# Patient Record
Sex: FEMALE | Race: WHITE | NOT HISPANIC OR LATINO | Employment: FULL TIME | URBAN - METROPOLITAN AREA
[De-identification: names, ages, dates, MRNs, and addresses within clinical notes are randomized per-mention and may not be internally consistent; named-entity substitution may affect disease eponyms.]

---

## 2020-12-27 NOTE — PROGRESS NOTES
Subjective:      Brittani Jameson is a 60 y.o. female who presents with Tingling            She is post tibial tendon and hammertoe repair. She was referred to neurology for nighttime squeezing pain in her feet.     She endorses the feeling of a bandage wrapping her feet and sand in between her toes. This has been a problem for 2 years. Both feet are affected. She used to have cramping in her toes but this has improved after her surgery. She can tolerate the discomfort when she is awake but she has this discomfort throughout the entire day. It does not prevent her from sleeping.     She has a history of alcohol use with 10 years of weekend drinking on her days off (6 beers/day). She has not been drinking for 3 years when she discovered that she has liver disease.     It is not extremely bothersome and she mainly wants a diagnosis for her symptoms.           Review of Systems   Constitutional: Negative for chills, fever and weight loss.   HENT: Negative for ear pain, sore throat and tinnitus.    Eyes: Negative for blurred vision, double vision and pain.   Respiratory: Negative for cough, shortness of breath and wheezing.    Cardiovascular: Negative for chest pain and palpitations.   Gastrointestinal: Negative for abdominal pain, diarrhea, nausea and vomiting.   Genitourinary: Negative for dysuria and frequency.   Musculoskeletal: Negative for back pain, myalgias and neck pain.   Skin: Negative for rash.   Neurological: Negative for dizziness, tremors, weakness and headaches.   Psychiatric/Behavioral: Negative for depression. The patient is not nervous/anxious and does not have insomnia.        Past medical history:   Past Medical History:   Diagnosis Date   • Chronic hepatitis C without mention of hepatic coma 10/17/2009   • Human T-cell lymphotrophic virus, type I (HTLV-I)    • Leg cramps    • Varicose vein        Past surgical history:   Past Surgical History:   Procedure Laterality Date   • PRIMARY C SECTION       x1   • TUBAL COAGULATION LAPAROSCOPIC BILATERAL         Family history:   Family History   Problem Relation Age of Onset   • Heart Disease Father    • Lung Disease Sister    • Alcohol/Drug Brother    • Heart Disease Brother    • Cancer Maternal Grandmother    • Cancer Paternal Grandmother        Social history:   Social History     Socioeconomic History   • Marital status:      Spouse name: Not on file   • Number of children: Not on file   • Years of education: Not on file   • Highest education level: Not on file   Occupational History   • Not on file   Social Needs   • Financial resource strain: Not on file   • Food insecurity     Worry: Not on file     Inability: Not on file   • Transportation needs     Medical: Not on file     Non-medical: Not on file   Tobacco Use   • Smoking status: Never Smoker   • Smokeless tobacco: Never Used   Substance and Sexual Activity   • Alcohol use: Not Currently     Alcohol/week: 5.0 oz     Types: 10 Cans of beer per week     Drinks per session: 5 or 6   • Drug use: No   • Sexual activity: Not on file   Lifestyle   • Physical activity     Days per week: Not on file     Minutes per session: Not on file   • Stress: Not on file   Relationships   • Social connections     Talks on phone: Not on file     Gets together: Not on file     Attends Hindu service: Not on file     Active member of club or organization: Not on file     Attends meetings of clubs or organizations: Not on file     Relationship status: Not on file   • Intimate partner violence     Fear of current or ex partner: Not on file     Emotionally abused: Not on file     Physically abused: Not on file     Forced sexual activity: Not on file   Other Topics Concern   • Not on file   Social History Narrative   • Not on file       Current medications:   Current Outpatient Medications   Medication   • Probiotic Product (ALIGN) 4 MG Cap     No current facility-administered medications for this visit.        Medication  "Allergy:  Allergies   Allergen Reactions   • Nkda [No Known Drug Allergy]           Objective:     /80 (BP Location: Right arm, Patient Position: Sitting, BP Cuff Size: Adult)   Pulse 87   Temp 36.5 °C (97.7 °F) (Temporal)   Resp 16   Ht 1.676 m (5' 6\")   Wt 83.9 kg (184 lb 15.5 oz)   SpO2 98%   BMI 29.85 kg/m²      Physical Exam       Lab Data Review:  No results found for: HBA1C       Assessment/Plan:     Problem List Items Addressed This Visit     Idiopathic peripheral neuropathy    Relevant Orders    VITAMIN B12    VITAMIN B6    HEMOGLOBIN A1C    SPEP W/REFLEX TO LORIE, A, G, M          1. Idiopathic peripheral neuropathy  - VITAMIN B12; Future  - VITAMIN B6; Future  - HEMOGLOBIN A1C; Future  - SPEP W/REFLEX TO LORIE, A, G, M       -This patient's description of the symptoms in her feet is consistent with a distal small fiber polyneuropathy. She is not on any medications that may cause neuropathy. I have counseled her on the etiologies including diabetes, alcohol, and vitamin B deficiencies. I have ordered a lab workup for etiologies of small fiber neuropathy. Given absence of motor or large fiber findings, a NCS will not be performed. Her discomfort is not severe enough to warrant drug treatment.     FOLLOW-UP:   Return in about 1 month (around 1/28/2021) for telemedicine .    I spent 30 minutes face-to-face in which greater than 50% of the visit was spent in counseling/coordination of care of peripheral neuropathy.     Dr. Parker Trevino D.O.  [facility]   Movement Disorders Specialist     "

## 2020-12-28 ENCOUNTER — OFFICE VISIT (OUTPATIENT)
Dept: NEUROLOGY | Facility: MEDICAL CENTER | Age: 60
End: 2020-12-28
Attending: PSYCHIATRY & NEUROLOGY
Payer: COMMERCIAL

## 2020-12-28 VITALS
WEIGHT: 184.97 LBS | TEMPERATURE: 97.7 F | HEART RATE: 87 BPM | OXYGEN SATURATION: 98 % | HEIGHT: 66 IN | DIASTOLIC BLOOD PRESSURE: 80 MMHG | RESPIRATION RATE: 16 BRPM | SYSTOLIC BLOOD PRESSURE: 124 MMHG | BODY MASS INDEX: 29.73 KG/M2

## 2020-12-28 DIAGNOSIS — G60.9 IDIOPATHIC PERIPHERAL NEUROPATHY: ICD-10-CM

## 2020-12-28 PROCEDURE — 99201 HCHG OFFICE VISIT-NEW-LEVEL 1: CPT | Performed by: PSYCHIATRY & NEUROLOGY

## 2020-12-28 PROCEDURE — 99204 OFFICE O/P NEW MOD 45 MIN: CPT | Performed by: PSYCHIATRY & NEUROLOGY

## 2020-12-28 SDOH — HEALTH STABILITY: MENTAL HEALTH: HOW MANY STANDARD DRINKS CONTAINING ALCOHOL DO YOU HAVE ON A TYPICAL DAY?: 5 OR 6

## 2020-12-28 ASSESSMENT — ENCOUNTER SYMPTOMS
DIARRHEA: 0
INSOMNIA: 0
HEADACHES: 0
CHILLS: 0
DEPRESSION: 0
SORE THROAT: 0
PALPITATIONS: 0
MYALGIAS: 0
TREMORS: 0
BLURRED VISION: 0
NERVOUS/ANXIOUS: 0
DIZZINESS: 0
VOMITING: 0
WHEEZING: 0
ABDOMINAL PAIN: 0
FEVER: 0
EYE PAIN: 0
BACK PAIN: 0
DOUBLE VISION: 0
NAUSEA: 0
WEIGHT LOSS: 0
SHORTNESS OF BREATH: 0
COUGH: 0
WEAKNESS: 0
NECK PAIN: 0

## 2020-12-28 NOTE — PATIENT INSTRUCTIONS
I have ordered some blood tests to evaluate for causes of peripheral neuropathy.   Since the pain is not bothersome, no drugs will be prescribed.   We will have a virtual follow-up visit in 1-2 months.       Peripheral Neuropathy  Peripheral neuropathy is a type of nerve damage. It affects nerves that carry signals between the spinal cord and the arms, legs, and the rest of the body (peripheral nerves). It does not affect nerves in the spinal cord or brain. In peripheral neuropathy, one nerve or a group of nerves may be damaged. Peripheral neuropathy is a broad category that includes many specific nerve disorders, like diabetic neuropathy, hereditary neuropathy, and carpal tunnel syndrome.  What are the causes?  This condition may be caused by:  · Diabetes. This is the most common cause of peripheral neuropathy.  · Nerve injury.  · Pressure or stress on a nerve that lasts a long time.  · Lack (deficiency) of B vitamins. This can result from alcoholism, poor diet, or a restricted diet.  · Infections.  · Autoimmune diseases, such as rheumatoid arthritis and systemic lupus erythematosus.  · Nerve diseases that are passed from parent to child (inherited).  · Some medicines, such as cancer medicines (chemotherapy).  · Poisonous (toxic) substances, such as lead and mercury.  · Too little blood flowing to the legs.  · Kidney disease.  · Thyroid disease.  In some cases, the cause of this condition is not known.  What are the signs or symptoms?  Symptoms of this condition depend on which of your nerves is damaged. Common symptoms include:  · Loss of feeling (numbness) in the feet, hands, or both.  · Tingling in the feet, hands, or both.  · Burning pain.  · Very sensitive skin.  · Weakness.  · Not being able to move a part of the body (paralysis).  · Muscle twitching.  · Clumsiness or poor coordination.  · Loss of balance.  · Not being able to control your bladder.  · Feeling dizzy.  · Sexual problems.  How is this  diagnosed?  Diagnosing and finding the cause of peripheral neuropathy can be difficult. Your health care provider will take your medical history and do a physical exam. A neurological exam will also be done. This involves checking things that are affected by your brain, spinal cord, and nerves (nervous system). For example, your health care provider will check your reflexes, how you move, and what you can feel.  You may have other tests, such as:  · Blood tests.  · Electromyogram (EMG) and nerve conduction tests. These tests check nerve function and how well the nerves are controlling the muscles.  · Imaging tests, such as CT scans or MRI to rule out other causes of your symptoms.  · Removing a small piece of nerve to be examined in a lab (nerve biopsy). This is rare.  · Removing and examining a small amount of the fluid that surrounds the brain and spinal cord (lumbar puncture). This is rare.  How is this treated?  Treatment for this condition may involve:  · Treating the underlying cause of the neuropathy, such as diabetes, kidney disease, or vitamin deficiencies.  · Stopping medicines that can cause neuropathy, such as chemotherapy.  · Medicine to relieve pain. Medicines may include:  ? Prescription or over-the-counter pain medicine.  ? Antiseizure medicine.  ? Antidepressants.  ? Pain-relieving patches that are applied to painful areas of skin.  · Surgery to relieve pressure on a nerve or to destroy a nerve that is causing pain.  · Physical therapy to help improve movement and balance.  · Devices to help you move around (assistive devices).  Follow these instructions at home:  Medicines  · Take over-the-counter and prescription medicines only as told by your health care provider. Do not take any other medicines without first asking your health care provider.  · Do not drive or use heavy machinery while taking prescription pain medicine.  Lifestyle    · Do not use any products that contain nicotine or tobacco,  such as cigarettes and e-cigarettes. Smoking keeps blood from reaching damaged nerves. If you need help quitting, ask your health care provider.  · Avoid or limit alcohol. Too much alcohol can cause a vitamin B deficiency, and vitamin B is needed for healthy nerves.  · Eat a healthy diet. This includes:  ? Eating foods that are high in fiber, such as fresh fruits and vegetables, whole grains, and beans.  ? Limiting foods that are high in fat and processed sugars, such as fried or sweet foods.  General instructions    · If you have diabetes, work closely with your health care provider to keep your blood sugar under control.  · If you have numbness in your feet:  ? Check every day for signs of injury or infection. Watch for redness, warmth, and swelling.  ? Wear padded socks and comfortable shoes. These help protect your feet.  · Develop a good support system. Living with peripheral neuropathy can be stressful. Consider talking with a mental health specialist or joining a support group.  · Use assistive devices and attend physical therapy as told by your health care provider. This may include using a walker or a cane.  · Keep all follow-up visits as told by your health care provider. This is important.  Contact a health care provider if:  · You have new signs or symptoms of peripheral neuropathy.  · You are struggling emotionally from dealing with peripheral neuropathy.  · Your pain is not well-controlled.  Get help right away if:  · You have an injury or infection that is not healing normally.  · You develop new weakness in an arm or leg.  · You fall frequently.  Summary  · Peripheral neuropathy is when the nerves in the arms, or legs are damaged, resulting in numbness, weakness, or pain.  · There are many causes of peripheral neuropathy, including diabetes, pinched nerves, vitamin deficiencies, autoimmune disease, and hereditary conditions.  · Diagnosing and finding the cause of peripheral neuropathy can be  difficult. Your health care provider will take your medical history, do a physical exam, and do tests, including blood tests and nerve function tests.  · Treatment involves treating the underlying cause of the neuropathy and taking medicines to help control pain. Physical therapy and assistive devices may also help.  This information is not intended to replace advice given to you by your health care provider. Make sure you discuss any questions you have with your health care provider.  Document Released: 12/08/2003 Document Revised: 11/30/2018 Document Reviewed: 02/26/2018  Elsevier Patient Education © 2020 [employer]

## 2021-03-15 DIAGNOSIS — Z23 NEED FOR VACCINATION: ICD-10-CM

## 2023-04-04 ENCOUNTER — APPOINTMENT (OUTPATIENT)
Dept: RADIOLOGY | Facility: MEDICAL CENTER | Age: 63
End: 2023-04-04
Attending: EMERGENCY MEDICINE
Payer: COMMERCIAL

## 2023-04-04 ENCOUNTER — HOSPITAL ENCOUNTER (EMERGENCY)
Facility: MEDICAL CENTER | Age: 63
End: 2023-04-04
Attending: EMERGENCY MEDICINE
Payer: COMMERCIAL

## 2023-04-04 VITALS
HEIGHT: 65 IN | DIASTOLIC BLOOD PRESSURE: 74 MMHG | OXYGEN SATURATION: 96 % | TEMPERATURE: 97.7 F | HEART RATE: 71 BPM | SYSTOLIC BLOOD PRESSURE: 148 MMHG | RESPIRATION RATE: 16 BRPM | BODY MASS INDEX: 29.97 KG/M2 | WEIGHT: 179.9 LBS

## 2023-04-04 DIAGNOSIS — K42.9 UMBILICAL HERNIA WITHOUT OBSTRUCTION AND WITHOUT GANGRENE: ICD-10-CM

## 2023-04-04 DIAGNOSIS — R10.84 GENERALIZED ABDOMINAL PAIN: ICD-10-CM

## 2023-04-04 LAB
ALBUMIN SERPL BCP-MCNC: 3.5 G/DL (ref 3.2–4.9)
ALBUMIN/GLOB SERPL: 1.3 G/DL
ALP SERPL-CCNC: 71 U/L (ref 30–99)
ALT SERPL-CCNC: 30 U/L (ref 2–50)
ANION GAP SERPL CALC-SCNC: 8 MMOL/L (ref 7–16)
APPEARANCE UR: CLEAR
AST SERPL-CCNC: 35 U/L (ref 12–45)
BASOPHILS # BLD AUTO: 0.8 % (ref 0–1.8)
BASOPHILS # BLD: 0.02 K/UL (ref 0–0.12)
BILIRUB SERPL-MCNC: 1.2 MG/DL (ref 0.1–1.5)
BILIRUB UR QL STRIP.AUTO: NEGATIVE
BUN SERPL-MCNC: 12 MG/DL (ref 8–22)
CALCIUM ALBUM COR SERPL-MCNC: 9.7 MG/DL (ref 8.5–10.5)
CALCIUM SERPL-MCNC: 9.3 MG/DL (ref 8.4–10.2)
CHLORIDE SERPL-SCNC: 105 MMOL/L (ref 96–112)
CO2 SERPL-SCNC: 25 MMOL/L (ref 20–33)
COLOR UR: YELLOW
CREAT SERPL-MCNC: 0.61 MG/DL (ref 0.5–1.4)
EOSINOPHIL # BLD AUTO: 0.17 K/UL (ref 0–0.51)
EOSINOPHIL NFR BLD: 6.5 % (ref 0–6.9)
ERYTHROCYTE [DISTWIDTH] IN BLOOD BY AUTOMATED COUNT: 49.1 FL (ref 35.9–50)
GFR SERPLBLD CREATININE-BSD FMLA CKD-EPI: 101 ML/MIN/1.73 M 2
GLOBULIN SER CALC-MCNC: 2.8 G/DL (ref 1.9–3.5)
GLUCOSE SERPL-MCNC: 97 MG/DL (ref 65–99)
GLUCOSE UR STRIP.AUTO-MCNC: NEGATIVE MG/DL
HCT VFR BLD AUTO: 34.9 % (ref 37–47)
HGB BLD-MCNC: 11.4 G/DL (ref 12–16)
IMM GRANULOCYTES # BLD AUTO: 0 K/UL (ref 0–0.11)
IMM GRANULOCYTES NFR BLD AUTO: 0 % (ref 0–0.9)
KETONES UR STRIP.AUTO-MCNC: NEGATIVE MG/DL
LEUKOCYTE ESTERASE UR QL STRIP.AUTO: NEGATIVE
LIPASE SERPL-CCNC: 28 U/L (ref 7–58)
LYMPHOCYTES # BLD AUTO: 0.88 K/UL (ref 1–4.8)
LYMPHOCYTES NFR BLD: 33.6 % (ref 22–41)
MCH RBC QN AUTO: 30.6 PG (ref 27–33)
MCHC RBC AUTO-ENTMCNC: 32.7 G/DL (ref 33.6–35)
MCV RBC AUTO: 93.6 FL (ref 81.4–97.8)
MICRO URNS: NORMAL
MONOCYTES # BLD AUTO: 0.36 K/UL (ref 0–0.85)
MONOCYTES NFR BLD AUTO: 13.7 % (ref 0–13.4)
NEUTROPHILS # BLD AUTO: 1.19 K/UL (ref 2–7.15)
NEUTROPHILS NFR BLD: 45.4 % (ref 44–72)
NITRITE UR QL STRIP.AUTO: NEGATIVE
NRBC # BLD AUTO: 0 K/UL
NRBC BLD-RTO: 0 /100 WBC
PH UR STRIP.AUTO: 6 [PH] (ref 5–8)
PLATELET # BLD AUTO: 50 K/UL (ref 164–446)
PMV BLD AUTO: 10.1 FL (ref 9–12.9)
POTASSIUM SERPL-SCNC: 3.6 MMOL/L (ref 3.6–5.5)
PROT SERPL-MCNC: 6.3 G/DL (ref 6–8.2)
PROT UR QL STRIP: NEGATIVE MG/DL
RBC # BLD AUTO: 3.73 M/UL (ref 4.2–5.4)
RBC UR QL AUTO: NEGATIVE
SODIUM SERPL-SCNC: 138 MMOL/L (ref 135–145)
SP GR UR STRIP.AUTO: 1.01
TROPONIN T SERPL-MCNC: <6 NG/L (ref 6–19)
WBC # BLD AUTO: 2.6 K/UL (ref 4.8–10.8)

## 2023-04-04 PROCEDURE — 99284 EMERGENCY DEPT VISIT MOD MDM: CPT

## 2023-04-04 PROCEDURE — 96375 TX/PRO/DX INJ NEW DRUG ADDON: CPT

## 2023-04-04 PROCEDURE — 36415 COLL VENOUS BLD VENIPUNCTURE: CPT

## 2023-04-04 PROCEDURE — 84484 ASSAY OF TROPONIN QUANT: CPT

## 2023-04-04 PROCEDURE — 81003 URINALYSIS AUTO W/O SCOPE: CPT

## 2023-04-04 PROCEDURE — 85025 COMPLETE CBC W/AUTO DIFF WBC: CPT

## 2023-04-04 PROCEDURE — 80053 COMPREHEN METABOLIC PANEL: CPT

## 2023-04-04 PROCEDURE — 83690 ASSAY OF LIPASE: CPT

## 2023-04-04 PROCEDURE — 96374 THER/PROPH/DIAG INJ IV PUSH: CPT

## 2023-04-04 PROCEDURE — 71045 X-RAY EXAM CHEST 1 VIEW: CPT

## 2023-04-04 PROCEDURE — 700111 HCHG RX REV CODE 636 W/ 250 OVERRIDE (IP): Performed by: EMERGENCY MEDICINE

## 2023-04-04 RX ORDER — KETOROLAC TROMETHAMINE 30 MG/ML
15 INJECTION, SOLUTION INTRAMUSCULAR; INTRAVENOUS ONCE
Status: COMPLETED | OUTPATIENT
Start: 2023-04-04 | End: 2023-04-04

## 2023-04-04 RX ORDER — ONDANSETRON 2 MG/ML
4 INJECTION INTRAMUSCULAR; INTRAVENOUS ONCE
Status: COMPLETED | OUTPATIENT
Start: 2023-04-04 | End: 2023-04-04

## 2023-04-04 RX ADMIN — ONDANSETRON 4 MG: 2 INJECTION INTRAMUSCULAR; INTRAVENOUS at 20:30

## 2023-04-04 RX ADMIN — MORPHINE SULFATE 6 MG: 10 INJECTION INTRAVENOUS at 20:30

## 2023-04-04 RX ADMIN — KETOROLAC TROMETHAMINE 15 MG: 30 INJECTION, SOLUTION INTRAMUSCULAR at 20:30

## 2023-04-05 NOTE — ED TRIAGE NOTES
Chief Complaint   Patient presents with    Abdominal Pain     A lump in her belly button upon waking this am, she did not experience any trauma.    Hx of stage 4 liver disease.

## 2023-04-05 NOTE — DISCHARGE INSTRUCTIONS
Please call the number above for [facility] to get evaluation for a potential repair of this hernia.  Return if it pops out and cannot go back in again, return for any vomiting

## 2023-04-05 NOTE — ED PROVIDER NOTES
"ER Provider Note    Scribed for Alphonse Bowen D.O. by Sydney Del Rosario. 4/4/2023  7:57 PM    Primary Care Provider: No primary care provider noted.    CHIEF COMPLAINT  Chief Complaint   Patient presents with    Abdominal Pain     A lump in her belly button upon waking this am, she did not experience any trauma.       HPI/ROS  LIMITATION TO HISTORY   None  OUTSIDE HISTORIAN(S):  None    Brittani Jameson is a 62 y.o. female with a history of stage 4 liver disease due to Hepatitis C who presents to the Emergency Department for abdominal pain onset this morning. This morning she noticed a \"lump\" to her abdomen around the belly button that is painful only when she applies pressure to the area. She also has a history of varices but has been cleared for a year. She does not experience any nausea or vomiting.    ROS as per HPI.    PAST MEDICAL HISTORY  Past Medical History:   Diagnosis Date    Chronic hepatitis C without mention of hepatic coma 10/17/2009    Human T-cell lymphotrophic virus, type I (HTLV-I)     Leg cramps     Varicose vein        SURGICAL HISTORY  Past Surgical History:   Procedure Laterality Date    PRIMARY C SECTION      x1    TUBAL COAGULATION LAPAROSCOPIC BILATERAL         FAMILY HISTORY  Family History   Problem Relation Age of Onset    Heart Disease Father     Lung Disease Sister     Alcohol/Drug Brother     Heart Disease Brother     Cancer Maternal Grandmother     Cancer Paternal Grandmother        SOCIAL HISTORY   reports that she has never smoked. She has never used smokeless tobacco. She reports that she does not currently use alcohol after a past usage of about 5.0 oz per week. She reports that she does not use drugs.    CURRENT MEDICATIONS  Previous Medications    PROBIOTIC PRODUCT (ALIGN) 4 MG CAP    Take 4 mg by mouth every day.       ALLERGIES  Nkda [no known drug allergy]    PHYSICAL EXAM  BP (!) 161/86   Pulse 78   Temp 36.7 °C (98 °F) (Temporal)   Resp 16   Ht 1.651 m (5' 5\")   Wt " 81.6 kg (179 lb 14.3 oz)   LMP  (Exact Date)   SpO2 100%   BMI 29.94 kg/m²     General: No acute distress.  HENT: Normocephalic, Mucus membranes are moist.   Chest: Lungs have even and unlabored respirations, Clear to auscultation.   Cardiovascular: Regular rate and regular rhythm, No peripheral cyanosis.  Abdomen: Non distended. Superior umbilicus has a tender nodule that is consistent with a hernia, not reducible.    Neuro: Awake, Conversive, Able to relay recent events.  Psychiatric: Calm and cooperative.     EXTERNAL RECORDS REVIEWED  Review of patient's past medical records show history of cirrhosis.     INITIAL ASSESSMENT  Patient has a non reducible hernia. We will treat for pain and attempt reduction to prevent emergent surgery.     ED Observation Status? Yes; I am placing the patient in to an observation status due to a diagnostic uncertainty as well as therapeutic intensity. Patient placed in observation status at 7:57 PM, 4/4/2023.     Observation plan is as follows: Will treat for pain and attempt further reduction.     Upon Reevaluation, the patient's condition has: Improved; and will be discharged.    Patient discharged from ED Observation status at 10:20 PM on 4/4/2023  DIAGNOSTIC STUDIES    Labs:   Results for orders placed or performed during the hospital encounter of 04/04/23   CBC with Differential   Result Value Ref Range    WBC 2.6 (L) 4.8 - 10.8 K/uL    RBC 3.73 (L) 4.20 - 5.40 M/uL    Hemoglobin 11.4 (L) 12.0 - 16.0 g/dL    Hematocrit 34.9 (L) 37.0 - 47.0 %    MCV 93.6 81.4 - 97.8 fL    MCH 30.6 27.0 - 33.0 pg    MCHC 32.7 (L) 33.6 - 35.0 g/dL    RDW 49.1 35.9 - 50.0 fL    Platelet Count 50 (L) 164 - 446 K/uL    MPV 10.1 9.0 - 12.9 fL    Neutrophils-Polys 45.40 44.00 - 72.00 %    Lymphocytes 33.60 22.00 - 41.00 %    Monocytes 13.70 (H) 0.00 - 13.40 %    Eosinophils 6.50 0.00 - 6.90 %    Basophils 0.80 0.00 - 1.80 %    Immature Granulocytes 0.00 0.00 - 0.90 %    Nucleated RBC 0.00 /100 WBC     Neutrophils (Absolute) 1.19 (L) 2.00 - 7.15 K/uL    Lymphs (Absolute) 0.88 (L) 1.00 - 4.80 K/uL    Monos (Absolute) 0.36 0.00 - 0.85 K/uL    Eos (Absolute) 0.17 0.00 - 0.51 K/uL    Baso (Absolute) 0.02 0.00 - 0.12 K/uL    Immature Granulocytes (abs) 0.00 0.00 - 0.11 K/uL    NRBC (Absolute) 0.00 K/uL   Complete Metabolic Panel   Result Value Ref Range    Sodium 138 135 - 145 mmol/L    Potassium 3.6 3.6 - 5.5 mmol/L    Chloride 105 96 - 112 mmol/L    Co2 25 20 - 33 mmol/L    Anion Gap 8.0 7.0 - 16.0    Glucose 97 65 - 99 mg/dL    Bun 12 8 - 22 mg/dL    Creatinine 0.61 0.50 - 1.40 mg/dL    Calcium 9.3 8.4 - 10.2 mg/dL    AST(SGOT) 35 12 - 45 U/L    ALT(SGPT) 30 2 - 50 U/L    Alkaline Phosphatase 71 30 - 99 U/L    Total Bilirubin 1.2 0.1 - 1.5 mg/dL    Albumin 3.5 3.2 - 4.9 g/dL    Total Protein 6.3 6.0 - 8.2 g/dL    Globulin 2.8 1.9 - 3.5 g/dL    A-G Ratio 1.3 g/dL   Lipase   Result Value Ref Range    Lipase 28 7 - 58 U/L   Urinalysis    Specimen: Urine   Result Value Ref Range    Color Yellow     Character Clear     Specific Gravity 1.015 <1.035    Ph 6.0 5.0 - 8.0    Glucose Negative Negative mg/dL    Ketones Negative Negative mg/dL    Protein Negative Negative mg/dL    Bilirubin Negative Negative    Nitrite Negative Negative    Leukocyte Esterase Negative Negative    Occult Blood Negative Negative    Micro Urine Req see below    Troponin   Result Value Ref Range    Troponin T <6 6 - 19 ng/L   CORRECTED CALCIUM   Result Value Ref Range    Correct Calcium 9.7 8.5 - 10.5 mg/dL   ESTIMATED GFR   Result Value Ref Range    GFR (CKD-EPI) 101 >60 mL/min/1.73 m 2        EKG:   I have independently interpreted the above EKG.    Radiology:   The attending emergency physician has independently interpreted the diagnostic imaging associated with this visit and am waiting the final reading from the radiologist.   Preliminary interpretation is as follows: No acute disease  Radiologist interpretation:   DX-CHEST-PORTABLE (1 VIEW)    Final Result         1. No acute cardiopulmonary abnormalities are identified.         COURSE & MEDICAL DECISION MAKING     COURSE AND PLAN  7:57 PM - Patient seen and examined at bedside. Discussed plan of care, including medication and hernia reduction. Patient agrees to the plan of care. The patient will be medicated with Zofran 4 mg injection, Toradol 15 mg injection, morphine 6 mg injection. Ordered for DX-Chest, EKG, Troponin, UA, Lipase, CMP, CBC w/ Diff to evaluate her symptoms.     ED Summary: Patient presented with acute umbilical hernia with tenderness and pain, this was not able to be reduced initially.  She was medicated for pain and the hernia did reduce.  She has had no vomiting, there is no concerns for obstruction.    Procedure, umbilical hernia reduction  The patient was attempted for reduction with and without medication.  The umbilical hernia was manually compressed, and pressed back into the abdomen.  She was placed in a Trendelenburg position which was able to reduce the hernia.  Her pain did resolve after that.        Discharged home in stable condition    FINAL DIAGNOSIS  1. Generalized abdominal pain    2. Umbilical hernia without obstruction and without gangrene        Sydney JAIN), am scribing for, and in the presence of, Alphonse Bowen D.O..    Electronically signed by: Sydney Del Rosario (Kami), 4/4/2023    Alphonse JAIN D.O. personally performed the services described in this documentation, as scribed by Sydney Del Rosario in my presence, and it is both accurate and complete.    The note accurately reflects work and decisions made by me.  Alphonse Bowen D.O.  4/4/2023  10:21 PM

## 2023-05-17 ENCOUNTER — HOSPITAL ENCOUNTER (EMERGENCY)
Facility: MEDICAL CENTER | Age: 63
End: 2023-05-17
Attending: EMERGENCY MEDICINE
Payer: COMMERCIAL

## 2023-05-17 VITALS
BODY MASS INDEX: 28.03 KG/M2 | HEART RATE: 81 BPM | DIASTOLIC BLOOD PRESSURE: 75 MMHG | TEMPERATURE: 98.4 F | RESPIRATION RATE: 16 BRPM | WEIGHT: 168.21 LBS | OXYGEN SATURATION: 97 % | SYSTOLIC BLOOD PRESSURE: 136 MMHG | HEIGHT: 65 IN

## 2023-05-17 DIAGNOSIS — K42.9 UMBILICAL HERNIA WITHOUT OBSTRUCTION AND WITHOUT GANGRENE: ICD-10-CM

## 2023-05-17 PROCEDURE — 49999 UNLISTED PX ABD PERTM&OMN: CPT

## 2023-05-17 PROCEDURE — 306637 HCHG MISC ORTHO ITEM RC 0274

## 2023-05-17 PROCEDURE — 99283 EMERGENCY DEPT VISIT LOW MDM: CPT

## 2023-05-17 RX ORDER — OMEPRAZOLE 20 MG/1
20 CAPSULE, DELAYED RELEASE ORAL DAILY
COMMUNITY

## 2023-05-17 RX ORDER — FERROUS SULFATE 325(65) MG
325 TABLET ORAL EVERY EVENING
COMMUNITY

## 2023-05-17 RX ORDER — MULTIVIT,IRON,MINERALS/LUTEIN
1 TABLET ORAL EVERY EVENING
COMMUNITY

## 2023-05-17 ASSESSMENT — FIBROSIS 4 INDEX: FIB4 SCORE: 7.8

## 2023-05-17 NOTE — DISCHARGE INSTRUCTIONS
Like we talked about if that hernia comes out and get stuck out, push it back in as you are able to.  If you are unable, you need to come back to the hospital.    As we also discussed, call Dr. Nicole and let her know that you had to come back once again to the ER for an incarcerated hernia and see if you can revisit surgical options.    In the interim, for any new symptoms return for the worse also return here.      Best of luck with your travel to [place].

## 2023-05-17 NOTE — ED PROVIDER NOTES
"ED Provider Note    CHIEF COMPLAINT  Chief Complaint   Patient presents with    Hernia     Reports umbilical hernia x1 month, now \"red and painful\".        EXTERNAL RECORDS REVIEWED  Inpatient Notes she was seen here about 6 weeks ago and had a umbilical hernia reduced; she was seen by Dr. Nicole a month ago.  .    HPI/ROS  LIMITATION TO HISTORY   Select: : None    Brittani Jameson is a 62 y.o. female who presents planing of recurrence of hernia.  Has been having problems with this for 5 or 6 months.  6 weeks ago, she had a hernia which was reduced by Dr. Munoz.  About a week or so after this, it came out again and she was able to reduce it by putting a gallon of milk on her stomach.  However, the last 2 weeks it has been out, and unable to put back.  She has had what she describes as almost early satiety, she is not sure whether that is related to the hernia or not.  There is been no nausea or vomiting.  No fever or chills.  No change in bowel or bladder.  She is followed by GI for cirrhosis.  Sounds like this is stable.    PAST MEDICAL HISTORY   has a past medical history of Chronic hepatitis C without mention of hepatic coma (10/17/2009), Cirrhosis (HCC), Human T-cell lymphotrophic virus, type I (HTLV-I), Leg cramps, and Varicose vein.    SURGICAL HISTORY   has a past surgical history that includes primary c section and tubal coagulation laparoscopic bilateral.    FAMILY HISTORY  Family History   Problem Relation Age of Onset    Heart Disease Father     Lung Disease Sister     Alcohol/Drug Brother     Heart Disease Brother     Cancer Maternal Grandmother     Cancer Paternal Grandmother        SOCIAL HISTORY  Social History     Tobacco Use    Smoking status: Never    Smokeless tobacco: Never   Substance and Sexual Activity    Alcohol use: Not Currently     Alcohol/week: 5.0 oz     Types: 10 Cans of beer per week    Drug use: No    Sexual activity: Not on file       CURRENT MEDICATIONS  Home Medications       " "Reviewed by Karon Singh PhT (Pharmacy Tech) on 05/17/23 at 1210  Med List Status: Complete     Medication Last Dose Status   ferrous sulfate 325 (65 Fe) MG tablet 5/15/2023 Active   Multiple Vitamins-Minerals (CENTRUM SILVER ULTRA WOMENS) Tab 5/15/2023 Active   omeprazole (PRILOSEC) 20 MG delayed-release capsule 5/17/2023 Active   Probiotic Product (ALIGN) 4 MG Cap 5/17/2023 Active                    ALLERGIES  Allergies   Allergen Reactions    Nkda [No Known Drug Allergy]        PHYSICAL EXAM  VITAL SIGNS: BP (!) 140/59   Pulse 80   Temp 37.3 °C (99.1 °F) (Temporal)   Resp 18   Ht 1.651 m (5' 5\")   Wt 76.3 kg (168 lb 3.4 oz)   LMP 01/20/2011   SpO2 98%   BMI 27.99 kg/m²    Constitutional: Well appearing patient in no acute distress.  HENT: Head is without trauma.  Oropharynx is clear.  Mucous membranes are moist.  Eyes: Sclerae are nonicteric, pupils are equally round.  Neck: Supple with grossly normal range of motion.  Cardiovascular: Heart is regular rate and rhythm without murmur rub or gallop.  Peripheral pulses are intact and symmetric throughout.  Thorax & Lungs: Breathing easily.  Good air movement.  There is no wheeze, rhonchi or rales.  Abdomen: Bowel sounds normal, soft, non-distended.  She has a thumb size knuckle firm mass at the umbilicus.  This is quite tender.  No other masses are appreciated.  Skin: No apparent rash.  I do not see petechiae or purpura.  Extremities: No evidence of acute trauma.    Neurologic: Alert. Moving all extremities.  Intact sensation and strength throughout.  Gait is normal.  Psychiatric: Normal for situation     DIAGNOSTIC STUDIES / PROCEDURES  Procedure note, reduction of umbilical hernia  Using firm consistent pressure over the hernia, combined with distraction with conversation, I was able to reduce the hernia with a palpable result.  Patient's pain completely resolved.  Afterwards, her belly is nontender.      COURSE & MEDICAL DECISION MAKING    ED " Observation Status? Yes; I am placing the patient in to an observation status due to a diagnostic uncertainty as well as therapeutic intensity. Patient placed in observation status at 1:17 PM, 5/17/2023.     Observation plan is as follows: I have reduced her hernia.  I would like to do serial abdominal exams on her postreduction.    Upon Reevaluation, the patient's condition has: Improved; and will be discharged.    Patient discharged from ED Observation status at 1435 (Time) 5/17/2023  (Date).     INITIAL ASSESSMENT, COURSE AND PLAN  Care Narrative: This patient presents with an incarcerated umbilical hernia.  I have reduced it.  I do not suspect bowel given her lack of symptoms given that 2 weeks of its presence.    Reevaluation at 1400: Feels fine.  No tenderness at all.    1435, still feels fine.  No tenderness.    I discussed the patient's case with Dr. Mckeon, who is on-call for surgery today.  I related the patient's history of umbilical hernia requiring reduction, seeing Dr. Nicole, complicating comorbidity of cirrhosis, and a second visit for incarcerated hernia which I have reduced.  He is recommended follow-up with Dr. Nicole.  I relayed this to the patient.  She is given my usual discussion about hernias.  Generic instructions about this as well.    ADDITIONAL PROBLEM LIST  Cirrhosis     DISPOSITION AND DISCUSSIONS  I have discussed management of the patient with the following physicians and EUGENIO's: General surgery    FINAL DIAGNOSIS  1. Umbilical hernia without obstruction and without gangrene    2.  Reduction of incarcerated medical hernia.       Electronically signed by: Nirmal Beatty M.D., 5/17/2023 1:17 PM

## 2025-06-28 ENCOUNTER — APPOINTMENT (OUTPATIENT)
Dept: RADIOLOGY | Facility: MEDICAL CENTER | Age: 65
End: 2025-06-28
Attending: EMERGENCY MEDICINE
Payer: COMMERCIAL

## 2025-06-28 ENCOUNTER — HOSPITAL ENCOUNTER (EMERGENCY)
Facility: MEDICAL CENTER | Age: 65
End: 2025-06-28
Attending: EMERGENCY MEDICINE
Payer: COMMERCIAL

## 2025-06-28 VITALS
WEIGHT: 166.01 LBS | HEIGHT: 66 IN | TEMPERATURE: 98.5 F | BODY MASS INDEX: 26.68 KG/M2 | HEART RATE: 85 BPM | RESPIRATION RATE: 17 BRPM | SYSTOLIC BLOOD PRESSURE: 147 MMHG | OXYGEN SATURATION: 98 % | DIASTOLIC BLOOD PRESSURE: 66 MMHG

## 2025-06-28 DIAGNOSIS — D69.6 THROMBOCYTOPENIA (HCC): ICD-10-CM

## 2025-06-28 DIAGNOSIS — N93.8 DYSFUNCTIONAL UTERINE BLEEDING: Primary | ICD-10-CM

## 2025-06-28 LAB
ALBUMIN SERPL BCP-MCNC: 3.6 G/DL (ref 3.2–4.9)
ALBUMIN/GLOB SERPL: 1 G/DL
ALP SERPL-CCNC: 78 U/L (ref 30–99)
ALT SERPL-CCNC: 32 U/L (ref 2–50)
ANION GAP SERPL CALC-SCNC: 9 MMOL/L (ref 7–16)
APTT PPP: 29.6 SEC (ref 24.7–36)
AST SERPL-CCNC: 43 U/L (ref 12–45)
BASOPHILS # BLD AUTO: 0.5 % (ref 0–1.8)
BASOPHILS # BLD: 0.01 K/UL (ref 0–0.12)
BILIRUB SERPL-MCNC: 1.5 MG/DL (ref 0.1–1.5)
BUN SERPL-MCNC: 11 MG/DL (ref 8–22)
CALCIUM ALBUM COR SERPL-MCNC: 9.8 MG/DL (ref 8.5–10.5)
CALCIUM SERPL-MCNC: 9.5 MG/DL (ref 8.5–10.5)
CHLORIDE SERPL-SCNC: 109 MMOL/L (ref 96–112)
CO2 SERPL-SCNC: 22 MMOL/L (ref 20–33)
CREAT SERPL-MCNC: 0.66 MG/DL (ref 0.5–1.4)
EOSINOPHIL # BLD AUTO: 0.07 K/UL (ref 0–0.51)
EOSINOPHIL NFR BLD: 3.6 % (ref 0–6.9)
ERYTHROCYTE [DISTWIDTH] IN BLOOD BY AUTOMATED COUNT: 50.4 FL (ref 35.9–50)
GFR SERPLBLD CREATININE-BSD FMLA CKD-EPI: 98 ML/MIN/1.73 M 2
GLOBULIN SER CALC-MCNC: 3.5 G/DL (ref 1.9–3.5)
GLUCOSE SERPL-MCNC: 92 MG/DL (ref 65–99)
HCT VFR BLD AUTO: 37.8 % (ref 37–47)
HGB BLD-MCNC: 12.1 G/DL (ref 12–16)
IMM GRANULOCYTES # BLD AUTO: 0.01 K/UL (ref 0–0.11)
IMM GRANULOCYTES NFR BLD AUTO: 0.5 % (ref 0–0.9)
INR PPP: 1.28 (ref 0.87–1.13)
LIPASE SERPL-CCNC: 27 U/L (ref 11–82)
LYMPHOCYTES # BLD AUTO: 0.44 K/UL (ref 1–4.8)
LYMPHOCYTES NFR BLD: 22.6 % (ref 22–41)
MCH RBC QN AUTO: 30.9 PG (ref 27–33)
MCHC RBC AUTO-ENTMCNC: 32 G/DL (ref 32.2–35.5)
MCV RBC AUTO: 96.4 FL (ref 81.4–97.8)
MONOCYTES # BLD AUTO: 0.25 K/UL (ref 0–0.85)
MONOCYTES NFR BLD AUTO: 12.8 % (ref 0–13.4)
NEUTROPHILS # BLD AUTO: 1.17 K/UL (ref 1.82–7.42)
NEUTROPHILS NFR BLD: 60 % (ref 44–72)
NRBC # BLD AUTO: 0 K/UL
NRBC BLD-RTO: 0 /100 WBC (ref 0–0.2)
PLATELET # BLD AUTO: 50 K/UL (ref 164–446)
PLATELET BLD QL SMEAR: NORMAL
PLATELETS.RETICULATED NFR BLD AUTO: 1.4 % (ref 0.6–13.1)
PMV BLD AUTO: 9.7 FL (ref 9–12.9)
POTASSIUM SERPL-SCNC: 3.9 MMOL/L (ref 3.6–5.5)
PROT SERPL-MCNC: 7.1 G/DL (ref 6–8.2)
PROTHROMBIN TIME: 16.4 SEC (ref 12–14.6)
RBC # BLD AUTO: 3.92 M/UL (ref 4.2–5.4)
SODIUM SERPL-SCNC: 140 MMOL/L (ref 135–145)
WBC # BLD AUTO: 2 K/UL (ref 4.8–10.8)

## 2025-06-28 PROCEDURE — 85610 PROTHROMBIN TIME: CPT

## 2025-06-28 PROCEDURE — 85730 THROMBOPLASTIN TIME PARTIAL: CPT

## 2025-06-28 PROCEDURE — 99284 EMERGENCY DEPT VISIT MOD MDM: CPT

## 2025-06-28 PROCEDURE — 36415 COLL VENOUS BLD VENIPUNCTURE: CPT

## 2025-06-28 PROCEDURE — 80053 COMPREHEN METABOLIC PANEL: CPT

## 2025-06-28 PROCEDURE — 76830 TRANSVAGINAL US NON-OB: CPT

## 2025-06-28 PROCEDURE — 85055 RETICULATED PLATELET ASSAY: CPT

## 2025-06-28 PROCEDURE — 83690 ASSAY OF LIPASE: CPT

## 2025-06-28 PROCEDURE — 85025 COMPLETE CBC W/AUTO DIFF WBC: CPT

## 2025-06-28 NOTE — ED NOTES
Orders recvd and reviewed with pt. Saline lock with blood draw, aware of pending u/s, call lightin reach, spouse at bedside, denies needs

## 2025-06-28 NOTE — ED TRIAGE NOTES
"Chief Complaint   Patient presents with    Vaginal Bleeding     Started \"a few mins ago\". Post-menopausal.      Physical Exam  Pulmonary:      Effort: Pulmonary effort is normal.   Skin:     General: Skin is warm and dry.   Neurological:      Mental Status: She is alert.       BP (!) 157/90   Pulse 66   Temp 36.8 °C (98.2 °F) (Temporal)   Resp 20   Ht 1.676 m (5' 6\")   Wt 75.3 kg (166 lb 0.1 oz)   LMP 01/20/2011   SpO2 96%   BMI 26.79 kg/m²     "

## 2025-06-28 NOTE — ED PROVIDER NOTES
"ED Provider Note    CHIEF COMPLAINT  Chief Complaint   Patient presents with    Vaginal Bleeding     Started \"a few mins ago\". Post-menopausal.        EXTERNAL RECORDS REVIEWED  External ED Note [facility] June 6, 2025, ultrasound abdomen demonstrates splenomegaly 19 cm and Outpatient labs & studies from April 2023 platelet count of 50, hemoglobin 11.4    HPI/ROS  LIMITATION TO HISTORY   Select: : None  OUTSIDE HISTORIAN(S):  Significant other at bedside for discussion history and symptoms    Brittani Jameson is a 64 y.o. female who presents with onset of vaginal bleeding and today described as heavy bleeding, onset after going to the bathroom.  She has history of hepatitis C and says approximate once a month gets ill with diarrhea, today symptoms were similar.  Prior to going to the store for some Imodium, she sat down in the bathroom.  She noticed blood upon standing up, felt herself and found the blood to be a vaginal source.  No rectal pain.  No pelvic pain.  She denies confusion or shakiness.  No epistaxis, no hematemesis.  Patient with known liver disease, history of splenomegaly and low platelets.    PAST MEDICAL HISTORY   has a past medical history of Chronic hepatitis C without mention of hepatic coma (10/17/2009), Cirrhosis (HCC), Human T-cell lymphotrophic virus, type I (HTLV-I), Leg cramps, and Varicose vein.    SURGICAL HISTORY   has a past surgical history that includes primary c section and tubal coagulation laparoscopic bilateral.    FAMILY HISTORY  Family History   Problem Relation Age of Onset    Heart Disease Father     Lung Disease Sister     Alcohol/Drug Brother     Heart Disease Brother     Cancer Maternal Grandmother     Cancer Paternal Grandmother        SOCIAL HISTORY  Social History     Tobacco Use    Smoking status: Never    Smokeless tobacco: Never   Substance and Sexual Activity    Alcohol use: Not Currently     Alcohol/week: 5.0 oz     Types: 10 Cans of beer per week    Drug " "use: No    Sexual activity: Not on file       CURRENT MEDICATIONS  Home Medications       Reviewed by Eulalia Victor R.N. (Registered Nurse) on 06/28/25 at 0919  Med List Status: Not Addressed     Medication Last Dose Status   ferrous sulfate 325 (65 Fe) MG tablet  Active   Multiple Vitamins-Minerals (CENTRUM SILVER ULTRA WOMENS) Tab  Active   omeprazole (PRILOSEC) 20 MG delayed-release capsule  Active   Probiotic Product (ALIGN) 4 MG Cap  Active                    ALLERGIES  Allergies[1]    PHYSICAL EXAM  VITAL SIGNS: BP (!) 157/90   Pulse 66   Temp 36.8 °C (98.2 °F) (Temporal)   Resp 20   Ht 1.676 m (5' 6\")   Wt 75.3 kg (166 lb 0.1 oz)   LMP 01/20/2011   SpO2 96%   BMI 26.79 kg/m²    Constitutional: Well-nourished, no power, no acute distress  Skin: No jaundice  Body: Strength intact, speech clear  Psychiatric: Normal mood  Cardiac: Regular rate, regular rhythm  Respiratory: Clear lung sounds  GI: Abdomen is soft and nontender, no guarding.  Rectal exam deferred at patient request.  There is palpable splenomegaly  Genitourinary: Bimanual exam performed with female nursing staff present, trace blood in the vaginal vault, no adnexal tenderness or palpable mass      EKG/LABS  Results for orders placed or performed during the hospital encounter of 06/28/25   CBC WITH DIFFERENTIAL    Collection Time: 06/28/25 10:20 AM   Result Value Ref Range    WBC 2.0 (L) 4.8 - 10.8 K/uL    RBC 3.92 (L) 4.20 - 5.40 M/uL    Hemoglobin 12.1 12.0 - 16.0 g/dL    Hematocrit 37.8 37.0 - 47.0 %    MCV 96.4 81.4 - 97.8 fL    MCH 30.9 27.0 - 33.0 pg    MCHC 32.0 (L) 32.2 - 35.5 g/dL    RDW 50.4 (H) 35.9 - 50.0 fL    Platelet Count 50 (L) 164 - 446 K/uL    MPV 9.7 9.0 - 12.9 fL    Neutrophils-Polys 60.00 44.00 - 72.00 %    Lymphocytes 22.60 22.00 - 41.00 %    Monocytes 12.80 0.00 - 13.40 %    Eosinophils 3.60 0.00 - 6.90 %    Basophils 0.50 0.00 - 1.80 %    Immature Granulocytes 0.50 0.00 - 0.90 %    Nucleated RBC 0.00 0.00 - 0.20 " /100 WBC    Neutrophils (Absolute) 1.17 (L) 1.82 - 7.42 K/uL    Lymphs (Absolute) 0.44 (L) 1.00 - 4.80 K/uL    Monos (Absolute) 0.25 0.00 - 0.85 K/uL    Eos (Absolute) 0.07 0.00 - 0.51 K/uL    Baso (Absolute) 0.01 0.00 - 0.12 K/uL    Immature Granulocytes (abs) 0.01 0.00 - 0.11 K/uL    NRBC (Absolute) 0.00 K/uL   COMP METABOLIC PANEL    Collection Time: 06/28/25 10:20 AM   Result Value Ref Range    Sodium 140 135 - 145 mmol/L    Potassium 3.9 3.6 - 5.5 mmol/L    Chloride 109 96 - 112 mmol/L    Co2 22 20 - 33 mmol/L    Anion Gap 9.0 7.0 - 16.0    Glucose 92 65 - 99 mg/dL    Bun 11 8 - 22 mg/dL    Creatinine 0.66 0.50 - 1.40 mg/dL    Calcium 9.5 8.5 - 10.5 mg/dL    Correct Calcium 9.8 8.5 - 10.5 mg/dL    AST(SGOT) 43 12 - 45 U/L    ALT(SGPT) 32 2 - 50 U/L    Alkaline Phosphatase 78 30 - 99 U/L    Total Bilirubin 1.5 0.1 - 1.5 mg/dL    Albumin 3.6 3.2 - 4.9 g/dL    Total Protein 7.1 6.0 - 8.2 g/dL    Globulin 3.5 1.9 - 3.5 g/dL    A-G Ratio 1.0 g/dL   LIPASE    Collection Time: 06/28/25 10:20 AM   Result Value Ref Range    Lipase 27 11 - 82 U/L   APTT    Collection Time: 06/28/25 10:20 AM   Result Value Ref Range    APTT 29.6 24.7 - 36.0 sec   PROTHROMBIN TIME (INR)    Collection Time: 06/28/25 10:20 AM   Result Value Ref Range    PT 16.4 (H) 12.0 - 14.6 sec    INR 1.28 (H) 0.87 - 1.13   ESTIMATED GFR    Collection Time: 06/28/25 10:20 AM   Result Value Ref Range    GFR (CKD-EPI) 98 >60 mL/min/1.73 m 2   PLATELET ESTIMATE    Collection Time: 06/28/25 10:20 AM   Result Value Ref Range    Plt Estimation Decreased    IMMATURE PLT FRACTION    Collection Time: 06/28/25 10:20 AM   Result Value Ref Range    Imm. Plt Fraction 1.4 0.6 - 13.1 %       I have independently interpreted this EKG    RADIOLOGY/PROCEDURES     Radiologist interpretation:  US-PELVIC COMPLETE (TRANSABDOMINAL/TRANSVAGINAL) (COMBO)   Final Result      1.  Slightly inhomogeneous uterus, a nonspecific finding.   2.  Endometrial thickness of 7 mm, possibly  endometrial hyperplasia. No focal endometrial mass lesions detected.   3.  Nonvisualization of the ovaries due to bowel gas.   4.  Partially visualized ascites.          COURSE & MEDICAL DECISION MAKING    ASSESSMENT, COURSE AND PLAN  Care Narrative: Patient presents with dysfunctional uterine bleeding, does not appear to be significant bleeding at this time with normal vital signs, improved hemoglobin at 12.1.  On pelvic exam, no active hemorrhage, source does appear to be vaginal based on blood on the glove following the exam.  We discussed rectal exam, ruling out GI source of the bleeding, this time patient is declining.  Ultrasound of the patient's pelvis demonstrates thickened endometrial stripe, 7 mm.  Patient is postmenopausal with hemorrhage, she is referred to women's health OB/GYN for follow-up care, consideration for endometrial biopsy.  Patient was given return instructions to the emergency department, advised to go to [facility] emergency department where her OB/GYN is on call if needed.  Patient is well-appearing upon discharge    Patient found to have thrombocytopenia, platelet count of 50, this is stable since labs April 2023      DISPOSITION AND DISCUSSIONS    Escalation of care considered, and ultimately not performed:acute inpatient care management, however at this time, the patient is most appropriate for outpatient management      Decision tools and prescription drugs considered including, but not limited to: Antibiotics not indicated on this visit, no evidence of bacterial infection.    FINAL DIAGNOSIS  1. Dysfunctional uterine bleeding    2. Thrombocytopenia (HCC)         Electronically signed by: William Lema M.D., 6/28/2025 9:38 AM           [1]   Allergies  Allergen Reactions    Nkda [No Known Drug Allergy]

## 2025-06-28 NOTE — DISCHARGE INSTRUCTIONS
Please schedule follow-up with OB/GYN.  For worsening bleeding, dizziness, shortness of breath or any concerns go to [facility] emergency department where OB/GYN is on-call if needed

## 2025-06-28 NOTE — ED NOTES
Pt for d/c. Aware of need to f/u with pcp and gyn-referral provided for same. Return to regional for worsening s/s

## 2025-06-30 ENCOUNTER — HOSPITAL ENCOUNTER (EMERGENCY)
Facility: MEDICAL CENTER | Age: 65
End: 2025-06-30
Attending: STUDENT IN AN ORGANIZED HEALTH CARE EDUCATION/TRAINING PROGRAM
Payer: COMMERCIAL

## 2025-06-30 VITALS
WEIGHT: 166.89 LBS | TEMPERATURE: 99.2 F | DIASTOLIC BLOOD PRESSURE: 63 MMHG | RESPIRATION RATE: 16 BRPM | SYSTOLIC BLOOD PRESSURE: 129 MMHG | HEART RATE: 84 BPM | HEIGHT: 66 IN | OXYGEN SATURATION: 95 % | BODY MASS INDEX: 26.82 KG/M2

## 2025-06-30 DIAGNOSIS — N95.0 POSTMENOPAUSAL VAGINAL BLEEDING: Primary | ICD-10-CM

## 2025-06-30 DIAGNOSIS — D69.6 THROMBOCYTOPENIA (HCC): ICD-10-CM

## 2025-06-30 LAB
ALBUMIN SERPL BCP-MCNC: 3.5 G/DL (ref 3.2–4.9)
ALBUMIN/GLOB SERPL: 1.1 G/DL
ALP SERPL-CCNC: 69 U/L (ref 30–99)
ALT SERPL-CCNC: 37 U/L (ref 2–50)
ANION GAP SERPL CALC-SCNC: 10 MMOL/L (ref 7–16)
AST SERPL-CCNC: 54 U/L (ref 12–45)
BASOPHILS # BLD AUTO: 0 % (ref 0–1.8)
BASOPHILS # BLD: 0 K/UL (ref 0–0.12)
BILIRUB SERPL-MCNC: 1.2 MG/DL (ref 0.1–1.5)
BUN SERPL-MCNC: 12 MG/DL (ref 8–22)
CALCIUM ALBUM COR SERPL-MCNC: 9.9 MG/DL (ref 8.5–10.5)
CALCIUM SERPL-MCNC: 9.5 MG/DL (ref 8.5–10.5)
CANDIDA DNA VAG QL PROBE+SIG AMP: NEGATIVE
CHLORIDE SERPL-SCNC: 108 MMOL/L (ref 96–112)
CO2 SERPL-SCNC: 21 MMOL/L (ref 20–33)
CREAT SERPL-MCNC: 0.65 MG/DL (ref 0.5–1.4)
EOSINOPHIL # BLD AUTO: 0 K/UL (ref 0–0.51)
EOSINOPHIL NFR BLD: 0 % (ref 0–6.9)
ERYTHROCYTE [DISTWIDTH] IN BLOOD BY AUTOMATED COUNT: 49.3 FL (ref 35.9–50)
G VAGINALIS DNA VAG QL PROBE+SIG AMP: NEGATIVE
GFR SERPLBLD CREATININE-BSD FMLA CKD-EPI: 98 ML/MIN/1.73 M 2
GLOBULIN SER CALC-MCNC: 3.2 G/DL (ref 1.9–3.5)
GLUCOSE SERPL-MCNC: 94 MG/DL (ref 65–99)
HCT VFR BLD AUTO: 35.9 % (ref 37–47)
HGB BLD-MCNC: 11.5 G/DL (ref 12–16)
LYMPHOCYTES # BLD AUTO: 0.48 K/UL (ref 1–4.8)
LYMPHOCYTES NFR BLD: 18.3 % (ref 22–41)
MANUAL DIFF BLD: NORMAL
MCH RBC QN AUTO: 30.7 PG (ref 27–33)
MCHC RBC AUTO-ENTMCNC: 32 G/DL (ref 32.2–35.5)
MCV RBC AUTO: 96 FL (ref 81.4–97.8)
MONOCYTES # BLD AUTO: 0.1 K/UL (ref 0–0.85)
MONOCYTES NFR BLD AUTO: 3.5 % (ref 0–13.4)
MORPHOLOGY BLD-IMP: NORMAL
NEUTROPHILS # BLD AUTO: 2.03 K/UL (ref 1.82–7.42)
NEUTROPHILS NFR BLD: 78.2 % (ref 44–72)
NRBC # BLD AUTO: 0 K/UL
NRBC BLD-RTO: 0 /100 WBC (ref 0–0.2)
PLATELET # BLD AUTO: 51 K/UL (ref 164–446)
PLATELET BLD QL SMEAR: NORMAL
PLATELETS.RETICULATED NFR BLD AUTO: 2.3 % (ref 0.6–13.1)
PMV BLD AUTO: 10.1 FL (ref 9–12.9)
POTASSIUM SERPL-SCNC: 4.2 MMOL/L (ref 3.6–5.5)
PROT SERPL-MCNC: 6.7 G/DL (ref 6–8.2)
RBC # BLD AUTO: 3.74 M/UL (ref 4.2–5.4)
RBC BLD AUTO: NORMAL
SODIUM SERPL-SCNC: 139 MMOL/L (ref 135–145)
T VAGINALIS DNA VAG QL PROBE+SIG AMP: NEGATIVE
WBC # BLD AUTO: 2.6 K/UL (ref 4.8–10.8)

## 2025-06-30 PROCEDURE — 85055 RETICULATED PLATELET ASSAY: CPT

## 2025-06-30 PROCEDURE — 80053 COMPREHEN METABOLIC PANEL: CPT

## 2025-06-30 PROCEDURE — 85007 BL SMEAR W/DIFF WBC COUNT: CPT

## 2025-06-30 PROCEDURE — 87510 GARDNER VAG DNA DIR PROBE: CPT

## 2025-06-30 PROCEDURE — 87480 CANDIDA DNA DIR PROBE: CPT

## 2025-06-30 PROCEDURE — 36415 COLL VENOUS BLD VENIPUNCTURE: CPT

## 2025-06-30 PROCEDURE — 99284 EMERGENCY DEPT VISIT MOD MDM: CPT

## 2025-06-30 PROCEDURE — 87660 TRICHOMONAS VAGIN DIR PROBE: CPT

## 2025-06-30 PROCEDURE — 85027 COMPLETE CBC AUTOMATED: CPT

## 2025-06-30 ASSESSMENT — FIBROSIS 4 INDEX: FIB4 SCORE: 9.58

## 2025-06-30 ASSESSMENT — PAIN DESCRIPTION - PAIN TYPE: TYPE: ACUTE PAIN

## 2025-06-30 NOTE — ED TRIAGE NOTES
Chief Complaint   Patient presents with    Vaginal Bleeding     Pt c/o intermittent heavy vaginal bleeding that began 3 days ago with pain to lower back and nausea. Pt was seen at [facility] 3 days ago was diagnosed with endometrial wall thickening.      Pt ambulatory to triage for above complaint.      Pt is alert/oriented and follows commands. Pt speaking in full sentences and responds appropriately to questions. No acute distress noted in triage and respirations are even and unlabored.     Pt placed in lobby and educated on triage process. Pt encouraged to alert staff for any changes in condition.

## 2025-06-30 NOTE — ED PROVIDER NOTES
ED Provider Note    CHIEF COMPLAINT  Chief Complaint   Patient presents with    Vaginal Bleeding     Pt c/o intermittent heavy vaginal bleeding that began 3 days ago with pain to lower back and nausea. Pt was seen at [facility] 3 days ago was diagnosed with endometrial wall thickening.        EXTERNAL RECORDS REVIEWED  Outpatient Notes office visit on 6/20/2025 for patient with cirrhosis    HPI/ROS  LIMITATION TO HISTORY   Select: : None  OUTSIDE HISTORIAN(S):    Brittani Jameson is a 64 y.o. female who presents with vaginal bleeding started 3 days ago, low back pain, nausea, abdominal cramping.  Patient had some endometrial thickening on ultrasound and was discharged home with outpatient gynecology follow-up but represented today after she had vaginal bleeding.  Patient denies fever chills body aches or sweats.  Patient denies dysuria or hematuria.  Patient had no masses on ultrasound completed 3 days ago.  Patient was told to come back if she had bleeding.  Patient has thrombocytopenia secondary to cirrhosis which is chronic for her.    PAST MEDICAL HISTORY   has a past medical history of Chronic hepatitis C without mention of hepatic coma (10/17/2009), Cirrhosis (HCC), Human T-cell lymphotrophic virus, type I (HTLV-I), Leg cramps, and Varicose vein.    SURGICAL HISTORY   has a past surgical history that includes primary c section and tubal coagulation laparoscopic bilateral.    FAMILY HISTORY  Family History   Problem Relation Age of Onset    Heart Disease Father     Lung Disease Sister     Alcohol/Drug Brother     Heart Disease Brother     Cancer Maternal Grandmother     Cancer Paternal Grandmother        SOCIAL HISTORY  Social History     Tobacco Use    Smoking status: Never    Smokeless tobacco: Never   Substance and Sexual Activity    Alcohol use: Not Currently     Alcohol/week: 5.0 oz     Types: 10 Cans of beer per week    Drug use: No    Sexual activity: Not on file       CURRENT MEDICATIONS  Home Medications   "     Reviewed by Anuja Combs R.N. (Registered Nurse) on 06/30/25 at 0856  Med List Status: Not Addressed     Medication Last Dose Status   ferrous sulfate 325 (65 Fe) MG tablet  Active   Multiple Vitamins-Minerals (CENTRUM SILVER ULTRA WOMENS) Tab  Active   omeprazole (PRILOSEC) 20 MG delayed-release capsule  Active   Probiotic Product (ALIGN) 4 MG Cap  Active                    ALLERGIES  Allergies[1]    PHYSICAL EXAM  VITAL SIGNS: /63   Pulse 84   Temp 37.3 °C (99.2 °F) (Temporal)   Resp 16   Ht 1.676 m (5' 6\")   Wt 75.7 kg (166 lb 14.2 oz)   LMP 01/20/2011   SpO2 95%   BMI 26.94 kg/m²    Vitals and nursing note reviewed.  Chaperone present.  Constitutional:       Comments: Patient is lying in bed supine, pleasant, conversant, speaking in complete sentences   HENT:      Head: Normocephalic and atraumatic.   Eyes:      Extraocular Movements: Extraocular movements intact.      Conjunctiva/sclera: Conjunctivae normal.      Pupils: Pupils are equal, round, and reactive to light.   Cardiovascular:      Pulses: Normal pulses.      Comments: HR 86  Pulmonary:      Effort: Pulmonary effort is normal. No respiratory distress.   Abdominal:      Comments: Abdomen is soft, non-tender, non-distended, non-rigid, no rebound, guarding, masses, no McBurney's point tenderness, no peritoneal signs, negative Rovsing sign, negative Navarro sign.  No CVA tenderness to palpation. Benign abdomen.   :  Pelvic exam scant clear discharge, no blood  Musculoskeletal:         General: No swelling. Normal range of motion.      Cervical back: Normal range of motion. No rigidity.   Skin:     General: Skin is warm and dry.      Capillary Refill: Capillary refill takes less than 2 seconds.   Neurological:      Mental Status: Alert.           COURSE & MEDICAL DECISION MAKING    ASSESSMENT, COURSE AND PLAN  Care Narrative: CBC to evaluate for acute anemia, thrombocytopenia and leukocytosis.  CMP to evaluate for acute electrolyte " abnormality, acute kidney injury, acute liver failure or dysfunction.  Pelvic exam will be conducted to evaluate for speed of bleeding.  Disposition pending lab work and gynecology consultation if indicated.    Electronically signed by: Juan Yoder M.D., 6/30/2025 9:49 AM    Right prep negative for BV, Candida, trichomonas.  Patient with thrombocytopenia and leukopenia.  Patient counseled to follow-up with PCP regarding these labs.  Patient counseled to follow-up with gastroenterologist.  Patient has been consulted to Dr. Villafuerte of gynecology who recommends patient follow-up for postmenopausal bleeding.  She does not believe the patient requires admission or emergent intervention at this time and can follow-up as an outpatient.  Referral has been placed by myself.  Patient feels fine at this time.  Disposition Home with outpatient follow-up and return for worsening symptoms.    Repeat physical exam benign.  I doubt any serious emergency process at this time.  Patient and/or family, friends given strict return precautions for worsening symptoms and care instructions. They have demonstrated understanding of discharge instructions through teach back mechanism. Advised PCP follow-up in 1-2 days.  Patient/family/friend expresses understanding and agrees to plan.    This dictation has been created using voice recognition software. I am continuously working with the software to minimize the number of voice recognition errors and I have made every attempt to manually correct the errors within my dictation. However errors  related to this voice recognition software may still exist and should be interpreted within the appropriate context.     Electronically signed by: Juan Yoder M.D., 6/30/2025 2:55 PM        DISPOSITION AND DISCUSSIONS  I have discussed management of the patient with the following physicians and EUGENIO's: Dr. Villafuerte    Escalation of care considered, and ultimately not performed:acute  inpatient care management, however at this time, the patient is most appropriate for outpatient management    Decision tools and prescription drugs considered including, but not limited to: Pain Medications  over-the-counter pain medications are appropriate, narcotics not indicated at this time  .    FINAL DIAGNOSIS  1. Postmenopausal vaginal bleeding    2. Thrombocytopenia (HCC)         Electronically signed by: Juan Yoder M.D., 6/30/2025 9:47 AM           [1]   Allergies  Allergen Reactions    Nkda [No Known Drug Allergy]

## 2025-07-03 NOTE — Clinical Note
REFERRAL APPROVAL NOTICE         Sent on July 3, 2025                   Brittani Jameson  [address]  [place] [address]                   Dear Ms. Jameson,    After a careful review of the medical information and benefit coverage, [facility] has processed your referral. See below for additional details.    If applicable, you must be actively enrolled with your insurance for coverage of the authorized service. If you have any questions regarding your coverage, please contact your insurance directly.    REFERRAL INFORMATION   Referral #:  [id number]  Referred-To Department    Referred-By Provider:  OB/Gyn    William Lema M.D.   [facility]      [address]  [place] [address]  [phone] [address]  [place] [id number] [phone]    Referral Start Date:  06/28/2025  Referral End Date:   06/28/2026             SCHEDULING  If you do not already have an appointment, please call [phone] to make an appointment.     MORE INFORMATION  If you do not already have a [facility] account, sign up at: [email or web address]  You can access your medical information, make appointments, see lab results, billing information, and more.  If you have questions regarding this referral, please contact  the [facility] Referrals department at:             [phone]. Monday - Friday 8:00AM - 5:00PM.     Sincerely,    [facility]

## 2025-07-08 ENCOUNTER — TELEPHONE (OUTPATIENT)
Dept: HEALTH INFORMATION MANAGEMENT | Facility: OTHER | Age: 65
End: 2025-07-08
Payer: COMMERCIAL